# Patient Record
(demographics unavailable — no encounter records)

---

## 2024-10-22 NOTE — HISTORY OF PRESENT ILLNESS
[FreeTextEntry1] : 24 year old P0 LMP 10/8/24 presents for annual gyn visit.  Pt feels well and has no complaints. Pt content with [facility]. She denies intermenstrual bleeding, itching, burning, or abnormal discharge.  [PapSmeardate] : 12/22 [No] : Patient does not have concerns regarding sex [Previously active] : previously active [FreeTextEntry3] : [facility] ([facility])

## 2024-10-22 NOTE — SIGNATURES
[TextEntry] : This note was written by Naila Lange on 10/22/2024 actively solely LIANNE Ann M.D 10/22/2024 . All medical record entries made by this scribe were at my  LIANNE Ann M.D  direction and personally dictated by me on 10/22/2024  . I have personally reviewed my chart and agree that the record reflects my personal performance of the history, physical exam, assessment, and plan.

## 2024-10-22 NOTE — PHYSICAL EXAM
[Chaperone Present] : A chaperone was present in the examining room during all aspects of the physical examination [FreeTextEntry2] : JUAN FRANCISCO Ayala [Appropriately responsive] : appropriately responsive [Alert] : alert [No Acute Distress] : no acute distress [No Lymphadenopathy] : no lymphadenopathy [Soft] : soft [Non-tender] : non-tender [Non-distended] : non-distended [No HSM] : No HSM [No Lesions] : no lesions [No Mass] : no mass [Oriented x3] : oriented x3 [Examination Of The Breasts] : a normal appearance [No Masses] : no breast masses were palpable [Labia Majora] : normal [Labia Minora] : normal [Normal] : normal [Uterine Adnexae] : normal

## 2024-10-22 NOTE — SIGNATURES
[TextEntry] : This note was written by Naila Laneg on 10/22/2024 actively solely LIANNE Ann M.D 10/22/2024 . All medical record entries made by this scribe were at my  LIANNE Ann M.D  direction and personally dictated by me on 10/22/2024  . I have personally reviewed my chart and agree that the record reflects my personal performance of the history, physical exam, assessment, and plan.

## 2024-10-22 NOTE — PLAN
[FreeTextEntry1] : 24 year old P0 LMP 10/8/24 presents for annual gyn visit.  RTO 1 year for annual

## 2024-12-23 NOTE — PHYSICAL EXAM
[Chaperone Present] : A chaperone was present in the examining room during all aspects of the physical examination [[id number]] : A chaperone was present during the pelvic exam. [Labia Majora Erythema] : erythema [Labia Minora Erythema] : erythema [Discharge] : a  ~M vaginal discharge was present [Scant] : scant [White] : white [Thick] : thick [Normal] : normal [FreeTextEntry2] : Wilda

## 2024-12-23 NOTE — PLAN
[FreeTextEntry1] : Pt to stop use of "Always" pads, Balmex prn to vulva or sitz baths for comfort Vaginitis culture collected Pt to CB for results

## 2024-12-23 NOTE — HISTORY OF PRESENT ILLNESS
[FreeTextEntry1] : This 23 yo presents c/o 2 weeks vulvo-vaginal itch with a discharge, self treated at first sign of discomfort with Monistat, but she feels the discharge has not decreased, no change in odor; did start to wear a panty liner with the increased vaginal discharge; last act of intercourse prior to symptom onset; no issues with voiding, no other treatments tried.